# Patient Record
Sex: FEMALE | Race: WHITE | NOT HISPANIC OR LATINO | Employment: FULL TIME | URBAN - METROPOLITAN AREA
[De-identification: names, ages, dates, MRNs, and addresses within clinical notes are randomized per-mention and may not be internally consistent; named-entity substitution may affect disease eponyms.]

---

## 2019-03-15 ENCOUNTER — OFFICE VISIT (OUTPATIENT)
Dept: FAMILY MEDICINE CLINIC | Facility: CLINIC | Age: 29
End: 2019-03-15
Payer: OTHER MISCELLANEOUS

## 2019-03-15 VITALS
HEIGHT: 64 IN | DIASTOLIC BLOOD PRESSURE: 78 MMHG | WEIGHT: 164.4 LBS | HEART RATE: 78 BPM | BODY MASS INDEX: 28.07 KG/M2 | SYSTOLIC BLOOD PRESSURE: 114 MMHG

## 2019-03-15 DIAGNOSIS — Z00.00 HEALTHCARE MAINTENANCE: Primary | ICD-10-CM

## 2019-03-15 PROCEDURE — 99203 OFFICE O/P NEW LOW 30 MIN: CPT | Performed by: PHYSICIAN ASSISTANT

## 2019-03-15 RX ORDER — EPINEPHRINE 0.3 MG/.3ML
0.3 INJECTION SUBCUTANEOUS
COMMUNITY

## 2019-03-15 RX ORDER — MONTELUKAST SODIUM 10 MG/1
10 TABLET ORAL
COMMUNITY
Start: 2018-03-19

## 2019-03-15 RX ORDER — ALBUTEROL SULFATE 90 UG/1
1 AEROSOL, METERED RESPIRATORY (INHALATION) EVERY 6 HOURS PRN
COMMUNITY
Start: 2018-03-19

## 2019-03-15 NOTE — PATIENT INSTRUCTIONS
1  Muscle spasm of the neck-patient had injury at work on Monday, March 11th  Pain persists  It seemed exacerbated after she tried to go back to work  Would recommend 1 week of rest and trial of ibuprofen 800 mg 3 times daily and cyclobenzaprine 1-3 times daily as necessary for pain or discomfort  Patient was advised to use moist heat and some stretching exercises  If symptoms are not significantly better in the next week would recommend physical therapy  She was given a note to remain out of work further next week until evaluated again

## 2019-03-15 NOTE — LETTER
March 15, 2019     Patient: Louisa Valencia   YOB: 1990   Date of Visit: 3/15/2019       To Whom it May Concern:    Louisa Valencia is under my professional care  She was seen in my office on 3/15/2019  She continues to follow our treatment plan for strain of the neck muscles  At this time she is not currently able to return to work until further evaluation which will take place on Friday the 22nd of March, 2019 for a possible return to work on Monday 3/25/2019  If you have any questions or concerns, please don't hesitate to call           Sincerely,          Verónica Harris PA-C        CC: No Recipients

## 2019-03-15 NOTE — PROGRESS NOTES
Assessment and Plan:  Patient Instructions   1  Muscle spasm of the neck-patient had injury at work on Monday, March 11th  Pain persists  It seemed exacerbated after she tried to go back to work  Would recommend 1 week of rest and trial of ibuprofen 800 mg 3 times daily and cyclobenzaprine 1-3 times daily as necessary for pain or discomfort  Patient was advised to use moist heat and some stretching exercises  If symptoms are not significantly better in the next week would recommend physical therapy  She was given a note to remain out of work further next week until evaluated again  Subjective:      Patient ID: Ken Rivera is a 33 y o  female  CC:    Chief Complaint   Patient presents with    Neck Pain     pt is here to establish care  she is also having right sided neck pain since monday ~cd       HPI:    HPI:  This is a 79-year-old female who presents to the office for follow-up of neck pain and spasm which started on Monday while she was working  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  She was reaching for something in turning with her neck to the side when she felt a sudden pull in the muscles  It seemed to get worse later that day  After she tried to return to work a few days later it again seem to exacerbate and worsen  She is not having any numbness or tingling radiating down into the hand or fingers  She is not having any weakness of the arm  It is a little bit bothersome when she is trying to lift her shoulder above her head  She was seen at the urgent care center initially and given ibuprofen and Flexeril but she has not tried any yet because she was concerned after she did not care for the interaction she had with the provider she had at that center        The following portions of the patient's history were reviewed and updated as appropriate: allergies, current medications, past family history, past medical history, past social history, past surgical history and problem list       Review of Systems   Constitutional: Negative for chills, fatigue and fever  HENT: Negative for congestion, ear pain and sinus pressure  Eyes: Negative for visual disturbance  Respiratory: Negative for cough, chest tightness and shortness of breath  Cardiovascular: Negative for chest pain and palpitations  Gastrointestinal: Negative for diarrhea, nausea and vomiting  Endocrine: Negative for polyuria  Genitourinary: Negative for dysuria and frequency  Musculoskeletal: Positive for arthralgias and myalgias  Skin: Negative for pallor and rash  Neurological: Negative for dizziness, weakness, light-headedness, numbness and headaches  Psychiatric/Behavioral: Negative for agitation, behavioral problems and sleep disturbance  All other systems reviewed and are negative  Data to review:       Objective:    Vitals:    03/15/19 1438   BP: 114/78   BP Location: Right leg   Patient Position: Sitting   Cuff Size: Standard   Pulse: 78   Weight: 74 6 kg (164 lb 6 4 oz)   Height: 5' 4" (1 626 m)        Physical Exam   Constitutional: She appears well-developed and well-nourished  No distress  HENT:   Head: Normocephalic and atraumatic  Eyes: Pupils are equal, round, and reactive to light  Conjunctivae are normal    Cardiovascular: Normal rate, normal heart sounds and intact distal pulses  No murmur heard  Pulmonary/Chest: Effort normal  No respiratory distress  She has no wheezes  Musculoskeletal: Normal range of motion  She exhibits tenderness  Patient does have some pain with forward flexion of the cervical spine  There is no midline cervical tenderness  There is tenderness to palpation at the right trapezius and sternocleidomastoid muscles  Full range of motion with the right shoulder

## 2019-03-22 ENCOUNTER — OFFICE VISIT (OUTPATIENT)
Dept: FAMILY MEDICINE CLINIC | Facility: CLINIC | Age: 29
End: 2019-03-22
Payer: OTHER MISCELLANEOUS

## 2019-03-22 VITALS
BODY MASS INDEX: 28 KG/M2 | WEIGHT: 164 LBS | SYSTOLIC BLOOD PRESSURE: 110 MMHG | HEIGHT: 64 IN | HEART RATE: 72 BPM | DIASTOLIC BLOOD PRESSURE: 78 MMHG

## 2019-03-22 DIAGNOSIS — M62.838 MUSCLE SPASMS OF NECK: ICD-10-CM

## 2019-03-22 DIAGNOSIS — Z23 NEED FOR INFLUENZA VACCINATION: ICD-10-CM

## 2019-03-22 DIAGNOSIS — Z23 NEED FOR TETANUS BOOSTER: Primary | ICD-10-CM

## 2019-03-22 DIAGNOSIS — M25.511 ACUTE PAIN OF RIGHT SHOULDER: ICD-10-CM

## 2019-03-22 PROCEDURE — 99213 OFFICE O/P EST LOW 20 MIN: CPT | Performed by: PHYSICIAN ASSISTANT

## 2019-03-22 NOTE — PATIENT INSTRUCTIONS
1  Neck muscle spasm-patient has had some improvement but symptoms are still present  She is concerned about returning to the physical nature of her work  Would recommend continued NSAIDs for the next week and muscle relaxers when necessary  She will be referred to Physical therapy for evaluation  Will keep her out for 1 more week in follow-up to determine her potential return  Patient verbalizes understanding and agreement with plan

## 2019-03-22 NOTE — PROGRESS NOTES
Assessment and Plan:  Patient Instructions   1  Neck muscle spasm-patient has had some improvement but symptoms are still present  She is concerned about returning to the physical nature of her work  Would recommend continued NSAIDs for the next week and muscle relaxers when necessary  She will be referred to Physical therapy for evaluation  Will keep her out for 1 more week in follow-up to determine her potential return  Patient verbalizes understanding and agreement with plan  Diagnoses and all orders for this visit:    Need for tetanus booster  -     TDAP VACCINE GREATER THAN OR EQUAL TO 8YO IM    Need for influenza vaccination  -     PREFERRED: influenza vaccine, 3001-9815, quadrivalent, recombinant, PF, 0 5 mL ([facility])    Muscle spasms of neck  -     Ambulatory referral to Physical Therapy; Future    Acute pain of right shoulder  -     Ambulatory referral to Physical Therapy; Future              Subjective:      Patient ID: Louisa Valencia is a 33 y o  female  CC:    Chief Complaint   Patient presents with    Follow-up     pt is here for follow up of right shoulder pain  she states she has noted some improvement~cd       HPI:    HPI:  This is a 54-year-old female who presents to the office for follow-up of injury that occurred at work  She has been using NSAIDs and muscle relaxer for the past week  She has felt some improvement but is still having some localized pain in the right trapezius and rotator cuff  She is having some pain with movements of the shoulder  Her job is fairly physical at times and requires setting up for photo shoot, 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  She is concerned about doing further injury to this muscle        The following portions of the patient's history were reviewed and updated as appropriate: allergies, current medications, past family history, past medical history, past social history, past surgical history and problem list       Review of Systems   Constitutional: Negative for chills, fatigue and fever  HENT: Negative for congestion, ear pain and sinus pressure  Eyes: Negative for visual disturbance  Respiratory: Negative for cough, chest tightness and shortness of breath  Cardiovascular: Negative for chest pain and palpitations  Gastrointestinal: Negative for diarrhea, nausea and vomiting  Endocrine: Negative for polyuria  Genitourinary: Negative for dysuria and frequency  Musculoskeletal: Negative for arthralgias and myalgias  Skin: Negative for pallor and rash  Neurological: Negative for dizziness, weakness, light-headedness, numbness and headaches  Psychiatric/Behavioral: Negative for agitation, behavioral problems and sleep disturbance  All other systems reviewed and are negative  Data to review:       Objective:    Vitals:    03/22/19 1431   BP: 110/78   BP Location: Left arm   Patient Position: Sitting   Cuff Size: Large   Pulse: 72   Weight: 74 4 kg (164 lb)   Height: 5' 4" (1 626 m)        Physical Exam   Constitutional: She appears well-developed and well-nourished  No distress  HENT:   Head: Normocephalic and atraumatic  Eyes: Pupils are equal, round, and reactive to light  Conjunctivae are normal    Cardiovascular: Normal rate, normal heart sounds and intact distal pulses  No murmur heard  Pulmonary/Chest: Effort normal  No respiratory distress  She has no wheezes  Musculoskeletal: Normal range of motion  She exhibits tenderness  She exhibits no edema or deformity  There is some tenderness to palpation of the right trapezius muscle  Full range of motion is present with the shoulder however there is pain with forced abduction at 90° and forward flexion at 90°  Full range of motion with the cervical spine present  No midline tenderness

## 2019-03-22 NOTE — LETTER
March 22, 2019     Patient: Chayito Webb   YOB: 1990   Date of Visit: 3/22/2019       To Whom it May Concern:    Chayito Webb is under my professional care  She was seen in my office on 3/22/2019  She is not yet able to safely return to work  She will be having further evaluation by physical therapy  I recommend follow-up with me in 1 week to determine her ability to return at that time  If you have any questions or concerns, please don't hesitate to call           Sincerely,          Nevin Callahan PA-C        CC: No Recipients

## 2019-03-29 ENCOUNTER — OFFICE VISIT (OUTPATIENT)
Dept: FAMILY MEDICINE CLINIC | Facility: CLINIC | Age: 29
End: 2019-03-29
Payer: OTHER MISCELLANEOUS

## 2019-03-29 VITALS
HEIGHT: 64 IN | DIASTOLIC BLOOD PRESSURE: 70 MMHG | BODY MASS INDEX: 28 KG/M2 | HEART RATE: 72 BPM | WEIGHT: 164 LBS | SYSTOLIC BLOOD PRESSURE: 108 MMHG

## 2019-03-29 DIAGNOSIS — M25.511 ACUTE PAIN OF RIGHT SHOULDER: ICD-10-CM

## 2019-03-29 DIAGNOSIS — M62.838 NECK MUSCLE SPASM: Primary | ICD-10-CM

## 2019-03-29 PROCEDURE — 99213 OFFICE O/P EST LOW 20 MIN: CPT | Performed by: PHYSICIAN ASSISTANT

## 2019-03-29 NOTE — PATIENT INSTRUCTIONS
1  Muscle spasm/strain of the right neck and shoulder muscles-patient with maximal pain at the right trapezius  She is seeing some improvement with physical therapy and has been able to wean off of any oral medications  She has physical therapy set up next week yet  I feel she is following the course of a normal muscle strain and often takes 4-6 weeks to resolve  It is likely with her improvement that she would be able to return after 1 more week of physical therapy however

## 2019-03-29 NOTE — PROGRESS NOTES
Assessment and Plan:  Patient Instructions   1  Muscle spasm/strain of the right neck and shoulder muscles-patient with maximal pain at the right trapezius  She is seeing some improvement with physical therapy and has been able to wean off of any oral medications  She has physical therapy set up next week yet  I feel she is following the course of a normal muscle strain and often takes 4-6 weeks to resolve  It is likely with her improvement that she would be able to return after 1 more week of physical therapy however  Diagnoses and all orders for this visit:    Neck muscle spasm    Acute pain of right shoulder              Subjective:      Patient ID: Basliia Ji is a 33 y o  female  CC:    Chief Complaint   Patient presents with    Follow-up     hurt right shoulder, pt states she feels a little better but still has alot of pain  JIM Meng       HPI:    HPI:  This is a 65-year-old female who presents to the office for follow-up of strain of her right neck and shoulder muscles  This occurred at work a few weeks ago and patient has started doing physical therapy now  She has been able to wean off of any oral medications  She has seen some benefits with physical therapy but is still having some pain in the muscles  She feels that she has had increased range of motion  Patient is set up for physical therapy appointments next week yet  She has not had any numbness or tingling in the hands or signs of radiculopathy  The following portions of the patient's history were reviewed and updated as appropriate: allergies, current medications, past family history, past medical history, past social history, past surgical history and problem list       Review of Systems   Constitutional: Negative for chills and fever  HENT: Negative for congestion  Respiratory: Negative for chest tightness and shortness of breath  Cardiovascular: Negative for chest pain and palpitations     Gastrointestinal: Negative for abdominal pain, diarrhea and vomiting  Musculoskeletal: Positive for arthralgias and myalgias  Skin: Negative for rash  Neurological: Negative for dizziness  Data to review:       Objective:    Vitals:    03/29/19 1430   BP: 108/70   BP Location: Left arm   Patient Position: Sitting   Cuff Size: Large   Pulse: 72   Weight: 74 4 kg (164 lb)   Height: 5' 4" (1 626 m)        Physical Exam   Constitutional: She appears well-developed and well-nourished  No distress  HENT:   Head: Normocephalic and atraumatic  Eyes: Pupils are equal, round, and reactive to light  Conjunctivae are normal    Cardiovascular: Normal rate, normal heart sounds and intact distal pulses  No murmur heard  Pulmonary/Chest: Effort normal  No respiratory distress  She has no wheezes  Musculoskeletal:   Full range of motion with the cervical spine and shoulder present  There is still some tenderness to palpation at the right trapezius muscles  Nursing note and vitals reviewed

## 2019-03-29 NOTE — LETTER
March 29, 2019     Patient: Sheryle Jungling   YOB: 1990   Date of Visit: 3/29/2019       To Whom it May Concern:    Sheryle Jungling is under my professional care  She was seen in my office on 3/29/2019  She continues to see gradual benefit with physical therapy at this point  I would recommend continuing with therapy for another week prior to returning to work  We will evaluate her in approximately 1 week time for potential return to work  If you have any questions or concerns, please don't hesitate to call           Sincerely,          Lico Ríos PA-C        CC: No Recipients

## 2019-04-05 ENCOUNTER — OFFICE VISIT (OUTPATIENT)
Dept: FAMILY MEDICINE CLINIC | Facility: CLINIC | Age: 29
End: 2019-04-05
Payer: OTHER MISCELLANEOUS

## 2019-04-05 VITALS
BODY MASS INDEX: 28 KG/M2 | HEART RATE: 68 BPM | DIASTOLIC BLOOD PRESSURE: 70 MMHG | WEIGHT: 164 LBS | SYSTOLIC BLOOD PRESSURE: 110 MMHG | HEIGHT: 64 IN

## 2019-04-05 DIAGNOSIS — M62.838 MUSCLE SPASM: ICD-10-CM

## 2019-04-05 DIAGNOSIS — M54.2 NECK PAIN: Primary | ICD-10-CM

## 2019-04-05 PROCEDURE — 99213 OFFICE O/P EST LOW 20 MIN: CPT | Performed by: PHYSICIAN ASSISTANT

## 2019-04-11 ENCOUNTER — HOSPITAL ENCOUNTER (OUTPATIENT)
Dept: MRI IMAGING | Facility: HOSPITAL | Age: 29
Discharge: HOME | End: 2019-04-11
Payer: OTHER MISCELLANEOUS

## 2019-04-11 DIAGNOSIS — M62.838 MUSCLE SPASM: ICD-10-CM

## 2019-04-11 DIAGNOSIS — M54.2 NECK PAIN: ICD-10-CM

## 2019-04-11 PROCEDURE — 72141 MRI NECK SPINE W/O DYE: CPT

## 2019-04-12 ENCOUNTER — TELEPHONE (OUTPATIENT)
Dept: FAMILY MEDICINE CLINIC | Facility: CLINIC | Age: 29
End: 2019-04-12

## 2019-04-17 ENCOUNTER — CONSULT (OUTPATIENT)
Dept: OBGYN CLINIC | Facility: CLINIC | Age: 29
End: 2019-04-17
Payer: OTHER MISCELLANEOUS

## 2019-04-17 VITALS
SYSTOLIC BLOOD PRESSURE: 123 MMHG | HEART RATE: 111 BPM | HEIGHT: 64 IN | BODY MASS INDEX: 28.17 KG/M2 | DIASTOLIC BLOOD PRESSURE: 87 MMHG | WEIGHT: 165 LBS

## 2019-04-17 DIAGNOSIS — M54.2 NECK PAIN: ICD-10-CM

## 2019-04-17 DIAGNOSIS — M62.838 MUSCLE SPASM: ICD-10-CM

## 2019-04-17 PROCEDURE — 99204 OFFICE O/P NEW MOD 45 MIN: CPT | Performed by: ORTHOPAEDIC SURGERY

## 2019-04-17 RX ORDER — METHYLPREDNISOLONE 4 MG/1
TABLET ORAL
Qty: 21 TABLET | Refills: 0 | Status: SHIPPED | OUTPATIENT
Start: 2019-04-17

## 2019-04-18 ENCOUNTER — OFFICE VISIT (OUTPATIENT)
Dept: FAMILY MEDICINE CLINIC | Facility: CLINIC | Age: 29
End: 2019-04-18
Payer: OTHER MISCELLANEOUS

## 2019-04-18 VITALS
HEIGHT: 64 IN | WEIGHT: 164.2 LBS | SYSTOLIC BLOOD PRESSURE: 124 MMHG | HEART RATE: 76 BPM | BODY MASS INDEX: 28.03 KG/M2 | DIASTOLIC BLOOD PRESSURE: 76 MMHG

## 2019-04-18 DIAGNOSIS — M62.838 MUSCLE SPASM: Primary | ICD-10-CM

## 2019-04-18 DIAGNOSIS — M25.511 ACUTE PAIN OF RIGHT SHOULDER: ICD-10-CM

## 2019-04-18 PROCEDURE — 99214 OFFICE O/P EST MOD 30 MIN: CPT | Performed by: PHYSICIAN ASSISTANT

## 2019-04-25 ENCOUNTER — TELEPHONE (OUTPATIENT)
Dept: FAMILY MEDICINE CLINIC | Facility: CLINIC | Age: 29
End: 2019-04-25

## 2019-04-25 DIAGNOSIS — M25.511 CHRONIC RIGHT SHOULDER PAIN: Primary | ICD-10-CM

## 2019-04-25 DIAGNOSIS — G89.29 CHRONIC RIGHT SHOULDER PAIN: Primary | ICD-10-CM

## 2019-04-26 ENCOUNTER — TELEPHONE (OUTPATIENT)
Dept: FAMILY MEDICINE CLINIC | Facility: CLINIC | Age: 29
End: 2019-04-26

## 2019-05-01 ENCOUNTER — OFFICE VISIT (OUTPATIENT)
Dept: FAMILY MEDICINE CLINIC | Facility: CLINIC | Age: 29
End: 2019-05-01
Payer: OTHER MISCELLANEOUS

## 2019-05-01 ENCOUNTER — TELEPHONE (OUTPATIENT)
Dept: FAMILY MEDICINE CLINIC | Facility: CLINIC | Age: 29
End: 2019-05-01

## 2019-05-01 VITALS
HEIGHT: 64 IN | SYSTOLIC BLOOD PRESSURE: 110 MMHG | DIASTOLIC BLOOD PRESSURE: 84 MMHG | BODY MASS INDEX: 27.66 KG/M2 | WEIGHT: 162 LBS | HEART RATE: 80 BPM

## 2019-05-01 DIAGNOSIS — M54.2 CERVICAL PAIN (NECK): Primary | ICD-10-CM

## 2019-05-01 DIAGNOSIS — M62.838 MUSCLE SPASM: ICD-10-CM

## 2019-05-01 DIAGNOSIS — M25.511 RIGHT SHOULDER PAIN, UNSPECIFIED CHRONICITY: ICD-10-CM

## 2019-05-01 DIAGNOSIS — M54.2 NECK PAIN: ICD-10-CM

## 2019-05-01 DIAGNOSIS — M54.2 NECK PAIN: Primary | ICD-10-CM

## 2019-05-01 PROCEDURE — 99213 OFFICE O/P EST LOW 20 MIN: CPT | Performed by: PHYSICIAN ASSISTANT

## 2019-05-01 RX ORDER — DIAZEPAM 2 MG/1
2 TABLET ORAL EVERY 12 HOURS PRN
Qty: 30 TABLET | Refills: 0 | Status: SHIPPED | OUTPATIENT
Start: 2019-05-01 | End: 2019-05-02 | Stop reason: SDUPTHER

## 2019-05-01 RX ORDER — DICLOFENAC SODIUM 75 MG/1
75 TABLET, DELAYED RELEASE ORAL 2 TIMES DAILY
Qty: 30 TABLET | Refills: 0 | Status: SHIPPED | OUTPATIENT
Start: 2019-05-01

## 2019-05-02 RX ORDER — DIAZEPAM 2 MG/1
2 TABLET ORAL EVERY 12 HOURS PRN
Qty: 10 TABLET | Refills: 0 | Status: SHIPPED | OUTPATIENT
Start: 2019-05-02